# Patient Record
Sex: FEMALE | Race: WHITE | NOT HISPANIC OR LATINO | URBAN - METROPOLITAN AREA
[De-identification: names, ages, dates, MRNs, and addresses within clinical notes are randomized per-mention and may not be internally consistent; named-entity substitution may affect disease eponyms.]

---

## 2023-11-14 ENCOUNTER — LAB (OUTPATIENT)
Dept: LAB | Facility: LAB | Age: 73
End: 2023-11-14
Payer: MEDICARE

## 2023-11-14 DIAGNOSIS — E78.00 PURE HYPERCHOLESTEROLEMIA, UNSPECIFIED: Primary | ICD-10-CM

## 2023-11-14 DIAGNOSIS — I10 ESSENTIAL (PRIMARY) HYPERTENSION: ICD-10-CM

## 2023-11-14 LAB
ALBUMIN SERPL-MCNC: 4.3 G/DL (ref 3.5–5)
ALP BLD-CCNC: 81 U/L (ref 35–125)
ALT SERPL-CCNC: 14 U/L (ref 5–40)
ANION GAP SERPL CALC-SCNC: 11 MMOL/L
AST SERPL-CCNC: 16 U/L (ref 5–40)
BASOPHILS # BLD AUTO: 0.04 X10*3/UL (ref 0–0.1)
BASOPHILS NFR BLD AUTO: 0.5 %
BILIRUB SERPL-MCNC: <0.2 MG/DL (ref 0.1–1.2)
BUN SERPL-MCNC: 14 MG/DL (ref 8–25)
CALCIUM SERPL-MCNC: 9.6 MG/DL (ref 8.5–10.4)
CHLORIDE SERPL-SCNC: 103 MMOL/L (ref 97–107)
CHOLEST SERPL-MCNC: 152 MG/DL (ref 133–200)
CHOLEST/HDLC SERPL: 3 {RATIO}
CO2 SERPL-SCNC: 28 MMOL/L (ref 24–31)
CREAT SERPL-MCNC: 0.7 MG/DL (ref 0.4–1.6)
CREAT UR-MCNC: 141 MG/DL
EOSINOPHIL # BLD AUTO: 0.31 X10*3/UL (ref 0–0.4)
EOSINOPHIL NFR BLD AUTO: 3.6 %
ERYTHROCYTE [DISTWIDTH] IN BLOOD BY AUTOMATED COUNT: 16.1 % (ref 11.5–14.5)
ERYTHROCYTE [SEDIMENTATION RATE] IN BLOOD BY WESTERGREN METHOD: 45 MM/H (ref 0–30)
GFR SERPL CREATININE-BSD FRML MDRD: >90 ML/MIN/1.73M*2
GLUCOSE SERPL-MCNC: 101 MG/DL (ref 65–99)
HCT VFR BLD AUTO: 38.9 % (ref 36–46)
HDLC SERPL-MCNC: 50 MG/DL
HGB BLD-MCNC: 12 G/DL (ref 12–16)
IMM GRANULOCYTES # BLD AUTO: 0.02 X10*3/UL (ref 0–0.5)
IMM GRANULOCYTES NFR BLD AUTO: 0.2 % (ref 0–0.9)
LDLC SERPL CALC-MCNC: 90 MG/DL (ref 65–130)
LYMPHOCYTES # BLD AUTO: 1.55 X10*3/UL (ref 0.8–3)
LYMPHOCYTES NFR BLD AUTO: 18.1 %
MCH RBC QN AUTO: 25.8 PG (ref 26–34)
MCHC RBC AUTO-ENTMCNC: 30.8 G/DL (ref 32–36)
MCV RBC AUTO: 84 FL (ref 80–100)
MICROALBUMIN UR-MCNC: <12 MG/L (ref 0–23)
MICROALBUMIN/CREAT UR: NORMAL MG/G{CREAT}
MONOCYTES # BLD AUTO: 0.58 X10*3/UL (ref 0.05–0.8)
MONOCYTES NFR BLD AUTO: 6.8 %
NEUTROPHILS # BLD AUTO: 6.05 X10*3/UL (ref 1.6–5.5)
NEUTROPHILS NFR BLD AUTO: 70.8 %
NRBC BLD-RTO: 0 /100 WBCS (ref 0–0)
PLATELET # BLD AUTO: 406 X10*3/UL (ref 150–450)
POTASSIUM SERPL-SCNC: 4.5 MMOL/L (ref 3.4–5.1)
PROT SERPL-MCNC: 7.3 G/DL (ref 5.9–7.9)
RBC # BLD AUTO: 4.66 X10*6/UL (ref 4–5.2)
SODIUM SERPL-SCNC: 142 MMOL/L (ref 133–145)
TRIGL SERPL-MCNC: 59 MG/DL (ref 40–150)
WBC # BLD AUTO: 8.6 X10*3/UL (ref 4.4–11.3)

## 2023-11-14 PROCEDURE — 82570 ASSAY OF URINE CREATININE: CPT

## 2023-11-14 PROCEDURE — 80053 COMPREHEN METABOLIC PANEL: CPT

## 2023-11-14 PROCEDURE — 85025 COMPLETE CBC W/AUTO DIFF WBC: CPT

## 2023-11-14 PROCEDURE — 82043 UR ALBUMIN QUANTITATIVE: CPT

## 2023-11-14 PROCEDURE — 85652 RBC SED RATE AUTOMATED: CPT

## 2023-11-14 PROCEDURE — 80061 LIPID PANEL: CPT

## 2025-04-16 ENCOUNTER — TELEMEDICINE CLINICAL SUPPORT (OUTPATIENT)
Dept: SURGERY | Facility: CLINIC | Age: 75
End: 2025-04-16
Payer: MEDICARE

## 2025-04-16 VITALS — WEIGHT: 127 LBS | HEIGHT: 61 IN | BODY MASS INDEX: 23.98 KG/M2

## 2025-04-16 DIAGNOSIS — Z12.11 SCREENING FOR COLORECTAL CANCER: ICD-10-CM

## 2025-04-16 DIAGNOSIS — Z12.12 SCREENING FOR COLORECTAL CANCER: ICD-10-CM

## 2025-04-16 RX ORDER — FOLIC ACID 1 MG/1
1 TABLET ORAL
COMMUNITY
Start: 2025-04-04

## 2025-04-16 RX ORDER — ROPINIROLE 0.5 MG/1
0.5 TABLET, FILM COATED ORAL DAILY
COMMUNITY
Start: 2025-04-04

## 2025-04-16 RX ORDER — METOPROLOL SUCCINATE 25 MG/1
25 TABLET, EXTENDED RELEASE ORAL DAILY
COMMUNITY
Start: 2025-04-04

## 2025-04-16 RX ORDER — ATORVASTATIN CALCIUM 10 MG/1
10 TABLET, FILM COATED ORAL NIGHTLY
COMMUNITY
Start: 2025-02-09

## 2025-04-16 ASSESSMENT — PAIN SCALES - GENERAL: PAINLEVEL_OUTOF10: 0-NO PAIN

## 2025-04-16 ASSESSMENT — PATIENT HEALTH QUESTIONNAIRE - PHQ9
2. FEELING DOWN, DEPRESSED OR HOPELESS: NOT AT ALL
SUM OF ALL RESPONSES TO PHQ9 QUESTIONS 1 AND 2: 0
1. LITTLE INTEREST OR PLEASURE IN DOING THINGS: NOT AT ALL

## 2025-04-16 NOTE — PATIENT INSTRUCTIONS
Please read the following immediately.   A Colonoscopy has been recommended to you.   This is an examination of your large intestine with a lighted flexible tube. You will be given sedative medication through an intravenous catheter and then the physician will pass the flexible tube into the rectum and through your entire large intestines. If you have a polyp (abnormal growth of tissue) it will be removed during the procedure. The physician may also take biopsies (small pieces of tissue for microscopic examination). The procedure will take about 45-60 minutes to complete and then you will rest in recovery for an additional 30-60 minutes while the sedation wears off.     Your colonoscopy will take place at: [address] [facility] [address] on Tuesday 6/3/25.  PURCHASE the following:        · Miralax (Glycolax) 8.3 oz (238 gm bottle) ?   ? Bisacodyl (Dulcolax laxative) 5mg   4 tablets - NOT suppositories   ? 64 oz of Gatorade or any non-carbonated clear liquid (Iced Tea, Crystal Light) Select green, yellow, or clear flavors (NO PURPLE OR RED)     If you take medication to thin your blood, such as Coumadin (Warfarin), Plavix (Clopidogrel), Xarelto (rivaroxaban) or Pradaxa (Dabigatran), Eliquis (Apizaban), Aggrenox (Aspirin/Dipyridamole) etc., ask the doctor that prescribed it for instructions prior to stopping. A baby Aspirin (81mg) may be continued.   STOP all fiber supplements or medications containing Iron 7 days prior to procedure.   Confirm that you have a  for the day of the procedure. You are not allowed to drive for 24 hours after your procedure.              ONE DAY BEFORE PROCEDURE    NO SOLID FOODS / NO ALCOHOL     Please call [phone] the day before your procedure after 2pm to confirm your arrival time.     Clear liquids only ALL DAY     ? AVOID anything red or purple   ? NO milk products or non-dairy creamer.   ? SEE LIST OF CLEAR LIQUIDS SUGGESTIONS   ? Speak with your primary care  physician about your diabetic medications     STARTING PREP: DAY BEFORE COLONOSCOPY     ? 5:00 PM: Mix the 8.3 oz bottle of Miralax in 64 oz of Gatorade or chosen clear liquid.   ? Drink 32 oz (1/2 of the 64 oz bottle) solution at a rate of 8 ounces every 15 min. and take 2 Dulcolax tablets with this. Keep the remaining 32 ounces.   ? Continue with clear liquids until bedtime.     MORNING OF PROCEDURE     5 HOURS PRIOR TO ARRIVAL TIME:     DRINK THE REMAINING 32 OUNCES and 2 Dulcolax tablets.     You may continue to drink clear liquids up to 4 hours prior to the procedure.     Drinking liquids after this will cause us to cancel or postpone your procedure     ** Also no gum, hard candy, mints and tobacco products in these 4 hours.     Not stopping your blood thinner in time as directed by your physician will cause us to cancel and reschedule your procedure.      DIABETICS: DO NOT take oral medication   DIABETA, GLUCOPHAGE   METFORMIN or JANUVIA     - If you are Insulin dependent, do not take your morning dose of insulin.   All patients may take morning medications with sips of water.          SUGGESTED CLEAR LIQUID GUIDE INFORMATION   ? Gatorade or Powerade   ? Clear broth or bouillon - chicken or beef   ? Coffee or Tea (no milk or no-dairy creamer)   ? Carbonated and Non-Carbonated Soft Drinks   ? Jonas-Aid or Crystal Light   ? Strained Fruit Juices (no pulp)   ? Jell-O, Popsicles, or Italian Ice     PRODUCTS TO AVOID   ? Cream or non-dairy creamer   ? Orange, Grapefruit or Tomato Juice   ? Creamed Soups or any soup other than broth   ? Oatmeal   ? Cream of Wheat   ? Milk or milkshakes     THINGS TO BRING WITH YOU!   ? A RESPONSIBLE    ? INSURANCE CARDS   ? A PHOTO ID   ? MEDICATION LIST   ? DURABLE POWER OF  AND LIVING WILL

## 2025-04-16 NOTE — LETTER
April 16, 2025    Raine Rueda  [address] [facility] [address]      Dear Ms. Rueda:        Please read the following immediately.   A Colonoscopy has been recommended to you.   This is an examination of your large intestine with a lighted flexible tube. You will be given sedative medication through an intravenous catheter and then the physician will pass the flexible tube into the rectum and through your entire large intestines. If you have a polyp (abnormal growth of tissue) it will be removed during the procedure. The physician may also take biopsies (small pieces of tissue for microscopic examination). The procedure will take about 45-60 minutes to complete and then you will rest in recovery for an additional 30-60 minutes while the sedation wears off.     Your colonoscopy will take place at: [address] [facility] [address]  PURCHASE the following: on 6/3/25.       · Miralax (Glycolax) 8.3 oz (238 gm bottle) ?   ? Bisacodyl (Dulcolax laxative) 5mg   4 tablets - NOT suppositories   ? 64 oz of Gatorade or any non-carbonated clear liquid (Iced Tea, Crystal Light) Select green, yellow, or clear flavors (NO PURPLE OR RED)     If you take medication to thin your blood, such as Coumadin (Warfarin), Plavix (Clopidogrel), Xarelto (rivaroxaban) or Pradaxa (Dabigatran), Eliquis (Apizaban), Aggrenox (Aspirin/Dipyridamole) etc., ask the doctor that prescribed it for instructions prior to stopping. A baby Aspirin (81mg) may be continued.   STOP all fiber supplements or medications containing Iron 7 days prior to procedure.   Confirm that you have a  for the day of the procedure. You are not allowed to drive for 24 hours after your procedure.              ONE DAY BEFORE PROCEDURE    NO SOLID FOODS / NO ALCOHOL     Please call [phone] the day before your procedure after 2pm to confirm your arrival time.     Clear liquids only ALL DAY     ? AVOID anything red or purple   ? NO milk products or  non-dairy creamer.   ? SEE LIST OF CLEAR LIQUIDS SUGGESTIONS   ? Speak with your primary care physician about your diabetic medications     STARTING PREP: DAY BEFORE COLONOSCOPY     ? 5:00 PM: Mix the 8.3 oz bottle of Miralax in 64 oz of Gatorade or chosen clear liquid.   ? Drink 32 oz (1/2 of the 64 oz bottle) solution at a rate of 8 ounces every 15 min. and take 2 Dulcolax tablets with this. Keep the remaining 32 ounces.   ? Continue with clear liquids until bedtime.     MORNING OF PROCEDURE     5 HOURS PRIOR TO ARRIVAL TIME:     DRINK THE REMAINING 32 OUNCES and 2 Dulcolax tablets.     You may continue to drink clear liquids up to 4 hours prior to the procedure.     Drinking liquids after this will cause us to cancel or postpone your procedure     ** Also no gum, hard candy, mints and tobacco products in these 4 hours.     Not stopping your blood thinner in time as directed by your physician will cause us to cancel and reschedule your procedure.      DIABETICS: DO NOT take oral medication   DIABETA, GLUCOPHAGE   METFORMIN or JANUVIA     - If you are Insulin dependent, do not take your morning dose of insulin.   All patients may take morning medications with sips of water.          SUGGESTED CLEAR LIQUID GUIDE INFORMATION   ? Gatorade or Powerade   ? Clear broth or bouillon - chicken or beef   ? Coffee or Tea (no milk or no-dairy creamer)   ? Carbonated and Non-Carbonated Soft Drinks   ? Jonas-Aid or Crystal Light   ? Strained Fruit Juices (no pulp)   ? Jell-O, Popsicles, or Italian Ice     PRODUCTS TO AVOID   ? Cream or non-dairy creamer   ? Orange, Grapefruit or Tomato Juice   ? Creamed Soups or any soup other than broth   ? Oatmeal   ? Cream of Wheat   ? Milk or milkshakes     THINGS TO BRING WITH YOU!   ? A RESPONSIBLE    ? INSURANCE CARDS   ? A PHOTO ID   ? MEDICATION LIST   ? DURABLE POWER OF  AND LIVING WILL

## 2025-04-16 NOTE — Clinical Note
Pls schedule pt for screening Colonoscopy with Dr. Belcher on 6/3/25 at [facility]. Surgery itinerary/bowel prep instructions reviewed with pt and mailed to her home address.

## 2025-04-16 NOTE — PROGRESS NOTES
This is a 75  year old  female who presents for telemedicine visit via telephone to discuss screening colonoscopy referred by Dr. Arenas  for Dr. Belcher .  Patient states that she had a colonoscopy over 10 years ago at [facility]. Patient states that she believes that she had one colon polyp removed.    Patient denies change in bowel habits, diarrhea, constipation, change in caliber of the stool, blood or mucous in stool, rectal pain, fevers, unintentional weight loss. Patient also denies issues with abdominal pain, nausea, vomiting, acid reflux, indigestion.    Patient denies a family history of colorectal cancer, colon polyps, IBD, other gastro intestinal issues. Patient denies use of blood thinners, NSAIDs. Patient denies use of tobacco, other drug use. Social alcohol use. All pertinent medical history, surgical history, social and family history were reviewed and updated with the patient.    Patient denies taking Glucagon-like peptide-1 (GLP-1) Ozempic, Saxenda, Mounjaro, Tazeum, Trulicity, Lyxumia, Byetta, Byoureon. Patient advised that these medications are used for Type 2 diabetes but more so recently for weight loss management. One side effect of this medication is that it inhibits gastric emptying therefor causing much higher risk of aspirating and desaturating.    Patient is unable to be physically examined due to telephone visit but denies chest pain, shortness of breath, abdominal pain or tenderness, skin abnormalities. Denies knowledge of bright red blood per rectum or hemorrhoids.    Risks, benefits, and alternatives to colonoscopy were discussed. Alternatives including colo guard and FIT testing were discussed as well as their inability to remove polyps that were detected. Patient understands that a bowel prep must be completed for adequate evaluation of the colon, and inadequate prep may allow missed lesions. Patient understands a risk of perforation is less than 1 in 5000 colonoscopy, risk of  serious bleeding or abdominal pain is less than 1%. Patient agrees to proceed with colonoscopy with possible biopsy.    Bowel prep instructions were discussed in detail with patient and reviewed thoroughly. Bowel prep instructions will be mailed to patient's home address on file or sent to the patient's MyChart.  Colonoscopy procedure, risks, and benefits were explained and reviewed with patient. All questions and concerns were addressed this visit.  Patient to be scheduled for screening colonoscopy under MAC with Dr. Belcher at [facility]. Over 30 minutes was spent on this patient counter including televisit, patient counseling and education, assessment and plan, reviewing necessary labs and diagnostics, and discussing pertinent education.      Counseling:  Medication education:  Education:  Current Medication list reviewed and discussed, Understanding:  Patient expressed understanding, Adherence:  No barriers to adherence identified, Education:  Current Medication list reviewed and discussed, Understanding:  Patient expressed understanding, Adherence:  No barriers to adherence identified.     Order placed in Epic for Procedure: Yes    Itinerary and Prep instructions sent: Mailed to home address  If self-pay, Estimate letter sent:N/A      Last EGD:Never per pt  Last COLON:Over 10 yrs ago per pt    Do you have a Family history of colon cancer &/or colon polyps?No  Do you have any GI symptoms you are concerned about?No      Decision Tree Answer From: REQUIRED to complete    What is the patient’ sedation requirement?MAC    Patient location for procedure:[facility]    What is your height? 5'1    What is your weight?127lbs    What is your BMI? 24         Do you have breathing problems?No    Do you have Sleep Apnea?No    Home oxygen use? No         Are you non-ambulatory? No    Do you have severe anxiety or take pain medication on a regular basis? No    Recreational Drug Use? No         Are you an insulin dependent  diabetic? No    Diabetic medications adjusted: No         Are you currently taking any blood thinners? No    Do you have a History of Heart failure or mechanical valves? No  Have you had a stroke (CVA or TIA) in the last 3 months? No    Do you have a Defibrillator (AICD) or Pacemaker & what is the date of last interrogation? No    Are you currently on dialysis? No

## 2025-05-27 ENCOUNTER — PRE-ADMISSION TESTING (OUTPATIENT)
Dept: PREADMISSION TESTING | Facility: HOSPITAL | Age: 75
End: 2025-05-27
Payer: MEDICARE

## 2025-05-27 VITALS
HEIGHT: 60 IN | TEMPERATURE: 98.6 F | OXYGEN SATURATION: 99 % | BODY MASS INDEX: 25.74 KG/M2 | HEART RATE: 79 BPM | WEIGHT: 131.1 LBS | DIASTOLIC BLOOD PRESSURE: 78 MMHG | SYSTOLIC BLOOD PRESSURE: 142 MMHG

## 2025-05-27 DIAGNOSIS — Z01.818 PRE-OP EXAMINATION: Primary | ICD-10-CM

## 2025-05-27 LAB
ANION GAP SERPL CALCULATED.3IONS-SCNC: 10 MMOL/L (ref 10–20)
BASOPHILS # BLD AUTO: 0.07 X10*3/UL (ref 0–0.1)
BASOPHILS NFR BLD AUTO: 0.6 %
BUN SERPL-MCNC: 18 MG/DL (ref 6–23)
CALCIUM SERPL-MCNC: 9.7 MG/DL (ref 8.6–10.3)
CHLORIDE SERPL-SCNC: 105 MMOL/L (ref 98–107)
CO2 SERPL-SCNC: 29 MMOL/L (ref 21–32)
CREAT SERPL-MCNC: 0.97 MG/DL (ref 0.5–1.05)
EGFRCR SERPLBLD CKD-EPI 2021: 61 ML/MIN/1.73M*2
EOSINOPHIL # BLD AUTO: 0.33 X10*3/UL (ref 0–0.4)
EOSINOPHIL NFR BLD AUTO: 2.8 %
ERYTHROCYTE [DISTWIDTH] IN BLOOD BY AUTOMATED COUNT: 24.4 % (ref 11.5–14.5)
GLUCOSE SERPL-MCNC: 89 MG/DL (ref 74–99)
HCT VFR BLD AUTO: 36.8 % (ref 36–46)
HGB BLD-MCNC: 11.4 G/DL (ref 12–16)
IMM GRANULOCYTES # BLD AUTO: 0.03 X10*3/UL (ref 0–0.5)
IMM GRANULOCYTES NFR BLD AUTO: 0.3 % (ref 0–0.9)
LYMPHOCYTES # BLD AUTO: 2.03 X10*3/UL (ref 0.8–3)
LYMPHOCYTES NFR BLD AUTO: 17 %
MCH RBC QN AUTO: 24.7 PG (ref 26–34)
MCHC RBC AUTO-ENTMCNC: 31 G/DL (ref 32–36)
MCV RBC AUTO: 80 FL (ref 80–100)
MONOCYTES # BLD AUTO: 1.13 X10*3/UL (ref 0.05–0.8)
MONOCYTES NFR BLD AUTO: 9.5 %
NEUTROPHILS # BLD AUTO: 8.32 X10*3/UL (ref 1.6–5.5)
NEUTROPHILS NFR BLD AUTO: 69.8 %
NRBC BLD-RTO: 0 /100 WBCS (ref 0–0)
OVALOCYTES BLD QL SMEAR: NORMAL
PLATELET # BLD AUTO: 321 X10*3/UL (ref 150–450)
POTASSIUM SERPL-SCNC: 4.8 MMOL/L (ref 3.5–5.3)
RBC # BLD AUTO: 4.61 X10*6/UL (ref 4–5.2)
RBC MORPH BLD: NORMAL
SCHISTOCYTES BLD QL SMEAR: NORMAL
SODIUM SERPL-SCNC: 139 MMOL/L (ref 136–145)
SPHEROCYTES BLD QL SMEAR: NORMAL
WBC # BLD AUTO: 11.9 X10*3/UL (ref 4.4–11.3)

## 2025-05-27 PROCEDURE — 99204 OFFICE O/P NEW MOD 45 MIN: CPT

## 2025-05-27 PROCEDURE — 85025 COMPLETE CBC W/AUTO DIFF WBC: CPT

## 2025-05-27 PROCEDURE — 80048 BASIC METABOLIC PNL TOTAL CA: CPT

## 2025-05-27 ASSESSMENT — ENCOUNTER SYMPTOMS
ALLERGIC/IMMUNOLOGIC NEGATIVE: 1
NEUROLOGICAL NEGATIVE: 1
GASTROINTESTINAL NEGATIVE: 1
CONSTITUTIONAL NEGATIVE: 1
RESPIRATORY NEGATIVE: 1
HEMATOLOGIC/LYMPHATIC NEGATIVE: 1
MUSCULOSKELETAL NEGATIVE: 1
ENDOCRINE NEGATIVE: 1
PSYCHIATRIC NEGATIVE: 1
EYES NEGATIVE: 1
CARDIOVASCULAR NEGATIVE: 1

## 2025-05-27 ASSESSMENT — DUKE ACTIVITY SCORE INDEX (DASI)
CAN YOU RUN A SHORT DISTANCE: NO
CAN YOU PARTICIPATE IN STRENOUS SPORTS LIKE SWIMMING, SINGLES TENNIS, FOOTBALL, BASKETBALL, OR SKIING: NO
DASI METS SCORE: 8
CAN YOU DO HEAVY WORK AROUND THE HOUSE LIKE SCRUBBING FLOORS OR LIFTING AND MOVING HEAVY FURNITURE: YES
CAN YOU WALK A BLOCK OR TWO ON LEVEL GROUND: YES
CAN YOU DO MODERATE WORK AROUND THE HOUSE LIKE VACUUMING, SWEEPING FLOORS OR CARRYING GROCERIES: YES
CAN YOU PARTICIPATE IN MODERATE RECREATIONAL ACTIVITIES LIKE GOLF, BOWLING, DANCING, DOUBLES TENNIS OR THROWING A BASEBALL OR FOOTBALL: YES
CAN YOU TAKE CARE OF YOURSELF (EAT, DRESS, BATHE, OR USE TOILET): YES
CAN YOU HAVE SEXUAL RELATIONS: YES
CAN YOU WALK INDOORS, SUCH AS AROUND YOUR HOUSE: YES
TOTAL_SCORE: 42.7
CAN YOU DO YARD WORK LIKE RAKING LEAVES, WEEDING OR PUSHING A MOWER: YES
CAN YOU CLIMB A FLIGHT OF STAIRS OR WALK UP A HILL: YES
CAN YOU DO LIGHT WORK AROUND THE HOUSE LIKE DUSTING OR WASHING DISHES: YES

## 2025-05-27 ASSESSMENT — PAIN SCALES - GENERAL: PAINLEVEL_OUTOF10: 0 - NO PAIN

## 2025-05-27 ASSESSMENT — PAIN - FUNCTIONAL ASSESSMENT: PAIN_FUNCTIONAL_ASSESSMENT: 0-10

## 2025-05-27 NOTE — H&P (VIEW-ONLY)
CPM/PAT Evaluation       Name: Raine Rueda (Raine Rueda)  /Age: 1950/75 y.o.     In-Person       Chief Complaint: Colonoscopy    HPI: Raine Rueda is a 75 year old female scheduled for a colonoscopy. She states her last colonoscopy was 10 years ago and was normal.  She denies blood in the stool, nausea, vomiting, constipation, diarrhea, unintentional weight loss, and family history of colon cancer.     Medical History[1]    Surgical History[2]    Social History     Tobacco Use    Smoking status: Never    Smokeless tobacco: Never   Substance Use Topics    Alcohol use: Never     Social History     Substance and Sexual Activity   Drug Use Never         Family History[3]    Allergies[4]  Current Outpatient Medications   Medication Sig Dispense Refill    atorvastatin (Lipitor) 10 mg tablet Take 1 tablet (10 mg) by mouth once daily at bedtime.      folic acid (Folvite) 1 mg tablet Take 1 tablet (1 mg) by mouth early in the morning..      metoprolol succinate XL (Toprol-XL) 25 mg 24 hr tablet Take 1 tablet (25 mg) by mouth once daily. as directed      rOPINIRole (Requip) 0.5 mg tablet Take 1 tablet (0.5 mg) by mouth once daily.       No current facility-administered medications for this visit.          Review of Systems   Constitutional: Negative.    HENT: Negative.     Eyes: Negative.    Respiratory: Negative.     Cardiovascular: Negative.    Gastrointestinal: Negative.    Endocrine: Negative.    Genitourinary: Negative.    Musculoskeletal: Negative.    Skin: Negative.    Allergic/Immunologic: Negative.    Neurological: Negative.    Hematological: Negative.    Psychiatric/Behavioral: Negative.             Physical Exam  Vitals reviewed.   Constitutional:       Appearance: Normal appearance.   HENT:      Head: Normocephalic and atraumatic.      Nose: Nose normal.      Mouth/Throat:      Mouth: Mucous membranes are moist.      Pharynx: Oropharynx is clear.   Eyes:      Extraocular Movements:  Extraocular movements intact.      Conjunctiva/sclera: Conjunctivae normal.   Cardiovascular:      Rate and Rhythm: Normal rate and regular rhythm.      Pulses: Normal pulses.      Heart sounds: Normal heart sounds.   Pulmonary:      Effort: Pulmonary effort is normal.      Breath sounds: Normal breath sounds.   Abdominal:      General: Bowel sounds are normal.      Palpations: Abdomen is soft.   Genitourinary:     Comments: Assessment deferred to physician  Musculoskeletal:         General: Normal range of motion.      Cervical back: Normal range of motion and neck supple.   Skin:     General: Skin is warm and dry.   Neurological:      General: No focal deficit present.      Mental Status: She is alert and oriented to person, place, and time.   Psychiatric:         Mood and Affect: Mood normal.         Behavior: Behavior normal.         Thought Content: Thought content normal.         Judgment: Judgment normal.          PAT AIRWAY:   Airway:     Mallampati::  III    TM distance::  >3 FB    Neck ROM::  Full  normal            Visit Vitals  /78   Pulse 79   Temp 37 °C (98.6 °F) (Temporal)   Ht 1.524 m (5')   Wt 59.5 kg (131 lb 1.6 oz)   SpO2 99%   BMI 25.60 kg/m²   OB Status Postmenopausal   Smoking Status Never   BSA 1.59 m²     ASA: II  CHADS: 2.8%  RCRI: 0.4%  DASI: 42.7  METS: 8  STOP BAN           Assessment and Plan:     Colonoscopy  Hypertension: Metoprolol  Hyperlipidemia: Atorvastatin  Restless leg: Ropinirole      LABS: CBC, BMP ordered in [facility]  [facility]of[facility]ANT[facility]               [1]   Past Medical History:  Diagnosis Date    Hyperlipidemia     Hypertension     Other specified health status     No known health problems    Restless leg syndrome    [2]   Past Surgical History:  Procedure Laterality Date    COLONOSCOPY W/ POLYPECTOMY      over 10 yrs ago, done at [facility] with 1 colon polyp removed    OTHER SURGICAL HISTORY  2021    Shoulder surgery    OTHER SURGICAL HISTORY  2021     Gallbladder surgery   [3]   Family History  Problem Relation Name Age of Onset    Pancreatic cancer Sister  56        passed awayin 5/2024   [4] No Known Allergies

## 2025-05-27 NOTE — PREPROCEDURE INSTRUCTIONS
Medication List            Accurate as of May 27, 2025  2:01 PM. Always use your most recent med list.                atorvastatin 10 mg tablet  Commonly known as: Lipitor  Medication Adjustments for Surgery: Take/Use as prescribed     folic acid 1 mg tablet  Commonly known as: Folvite  Additional Medication Adjustments for Surgery: Take last dose 7 days before surgery     metoprolol succinate XL 25 mg 24 hr tablet  Commonly known as: Toprol-XL  Medication Adjustments for Surgery: Take on the morning of surgery     rOPINIRole 0.5 mg tablet  Commonly known as: Requip  Medication Adjustments for Surgery: Take/Use as prescribed                    Preoperative Fasting Guidelines    Why must I stop eating and drinking near surgery time?  With sedation, food or liquid in your stomach can enter your lungs causing serious complications  Increases nausea and vomiting    When do I need to stop eating and drinking before my surgery?  Do not eat any food after midnight the night before your surgery/procedure.  You may have up to 13.5 ounces of clear liquid until TWO hours before your instructed arrival time to the hospital.  This includes water, black tea/coffee, (no milk or cream) apple juice, and electrolyte drinks (Gatorade)  You may chew gum until TWO hours before your surgery/procedure  FOLLOW BOWEL PREP INSTRUCTIONS RECEIVED FROM DR. ARMSTRONG'S OFFICE    PAT DISCHARGE INSTRUCTIONS    Please call the Same Day Surgery (SDS) Department of the hospital where your procedure will be performed after 2:00 PM the day before your surgery. If you are scheduled on a Monday, or a Tuesday following a Monday holiday, you will need to call on the last business day prior to your surgery.    [facility]  [address] [place] [address] [phone]  [address], [address] [phone]  [facility]  [facility]  [address] [place] [address]  [phone]    Please let your surgeon know if:      You develop any open sores, shingles, burning or painful urination as these may increase your risk of an infection.   You no longer wish to have the surgery.   Any other personal circumstances change that may lead to the need to cancel or defer this surgery-such as being sick or getting admitted to any hospital within one week of your planned procedure.    Your contact details change, such as a change of address or phone number.    Starting now:     Please DO NOT drink alcohol or smoke for 24 hours before surgery. It is well known that quitting smoking can make a huge difference to your health and recovery from surgery. The longer you abstain from smoking, the better your chances of a healthy recovery. If you need help with quitting, call 5-800QUIT-NOW to be connected to a trained counselor who will discuss the best methods to help you quit.     Before your surgery:    Please stop all supplements 7 days prior to surgery. Or as directed by your surgeon.   Please stop taking NSAID pain medicine such as Advil and Motrin 7 days before surgery.    If you develop any fever, cough, cold, rashes, cuts, scratches, scrapes, urinary symptoms or infection anywhere on your body (including teeth and gums) prior to surgery, please call your surgeon’s office as soon as possible. This may require treatment to reduce the chance of cancellation on the day of surgery.    The day before your surgery:   DIET- Please follow the diet instructions at the top of your packet.   Get a good night’s rest.  Use the special soap for bathing if you have been instructed to use one.    Scheduled surgery times may change and you will be notified if this occurs - please check your personal voicemail for any updates.     On the morning of surgery:   Wear comfortable, loose fitting clothes which open in the front. Please do  not wear moisturizers, creams, lotions, makeup or perfume.    Please bring with you to surgery:   Photo ID and insurance card   Current list of medicines and allergies   Pacemaker/ Defibrillator/Heart stent cards   CPAP machine and mask    Slings/ splints/ crutches   A copy of your complete advanced directive/DHPOA.    Please do NOT bring with you to surgery:   All jewelry and valuables should be left at home.   Prosthetic devices such as contact lenses, hearing aids, dentures, eyelash extensions, hairpins and body piercings must be removed prior to going in to the surgical suite.    After outpatient surgery:   A responsible adult MUST accompany you at the time of discharge and stay with you for 24 hours after your surgery. You may NOT drive yourself home after surgery.    Do not drive, operate machinery, make critical decisions or do activities that require co-ordination or balance until after a night’s sleep.   Do not drink alcoholic beverages for 24 hours.   Instructions for resuming your medications will be provided by your surgeon.    CALL YOUR DOCTOR AFTER SURGERY IF YOU HAVE:     Chills and/or a fever of 101° F or higher.    Redness, swelling, pus or drainage from your surgical wound or a bad smell from the wound.    Lightheadedness, fainting or confusion.    Persistent vomiting (throwing up) and are not able to eat or drink for 12 hours.    Three or more loose, watery bowel movements in 24 hours (diarrhea).   Difficulty or pain while urinating( after non-urological surgery)    Pain and swelling in your legs, especially if it is only on one side.    Difficulty breathing or are breathing faster than normal.    Any new concerning symptoms.

## 2025-05-27 NOTE — CPM/PAT H&P
CPM/PAT Evaluation       Name: Raine Rueda (Raine Rueda)  /Age: 1950/75 y.o.     In-Person       Chief Complaint: Colonoscopy    HPI: Raine Rueda is a 75 year old female scheduled for a colonoscopy. She states her last colonoscopy was 10 years ago and was normal.  She denies blood in the stool, nausea, vomiting, constipation, diarrhea, unintentional weight loss, and family history of colon cancer.     Medical History[1]    Surgical History[2]    Social History     Tobacco Use    Smoking status: Never    Smokeless tobacco: Never   Substance Use Topics    Alcohol use: Never     Social History     Substance and Sexual Activity   Drug Use Never         Family History[3]    Allergies[4]  Current Outpatient Medications   Medication Sig Dispense Refill    atorvastatin (Lipitor) 10 mg tablet Take 1 tablet (10 mg) by mouth once daily at bedtime.      folic acid (Folvite) 1 mg tablet Take 1 tablet (1 mg) by mouth early in the morning..      metoprolol succinate XL (Toprol-XL) 25 mg 24 hr tablet Take 1 tablet (25 mg) by mouth once daily. as directed      rOPINIRole (Requip) 0.5 mg tablet Take 1 tablet (0.5 mg) by mouth once daily.       No current facility-administered medications for this visit.          Review of Systems   Constitutional: Negative.    HENT: Negative.     Eyes: Negative.    Respiratory: Negative.     Cardiovascular: Negative.    Gastrointestinal: Negative.    Endocrine: Negative.    Genitourinary: Negative.    Musculoskeletal: Negative.    Skin: Negative.    Allergic/Immunologic: Negative.    Neurological: Negative.    Hematological: Negative.    Psychiatric/Behavioral: Negative.             Physical Exam  Vitals reviewed.   Constitutional:       Appearance: Normal appearance.   HENT:      Head: Normocephalic and atraumatic.      Nose: Nose normal.      Mouth/Throat:      Mouth: Mucous membranes are moist.      Pharynx: Oropharynx is clear.   Eyes:      Extraocular Movements:  Extraocular movements intact.      Conjunctiva/sclera: Conjunctivae normal.   Cardiovascular:      Rate and Rhythm: Normal rate and regular rhythm.      Pulses: Normal pulses.      Heart sounds: Normal heart sounds.   Pulmonary:      Effort: Pulmonary effort is normal.      Breath sounds: Normal breath sounds.   Abdominal:      General: Bowel sounds are normal.      Palpations: Abdomen is soft.   Genitourinary:     Comments: Assessment deferred to physician  Musculoskeletal:         General: Normal range of motion.      Cervical back: Normal range of motion and neck supple.   Skin:     General: Skin is warm and dry.   Neurological:      General: No focal deficit present.      Mental Status: She is alert and oriented to person, place, and time.   Psychiatric:         Mood and Affect: Mood normal.         Behavior: Behavior normal.         Thought Content: Thought content normal.         Judgment: Judgment normal.          PAT AIRWAY:   Airway:     Mallampati::  III    TM distance::  >3 FB    Neck ROM::  Full  normal            Visit Vitals  /78   Pulse 79   Temp 37 °C (98.6 °F) (Temporal)   Ht 1.524 m (5')   Wt 59.5 kg (131 lb 1.6 oz)   SpO2 99%   BMI 25.60 kg/m²   OB Status Postmenopausal   Smoking Status Never   BSA 1.59 m²     ASA: II  CHADS: 2.8%  RCRI: 0.4%  DASI: 42.7  METS: 8  STOP BAN           Assessment and Plan:     Colonoscopy  Hypertension: Metoprolol  Hyperlipidemia: Atorvastatin  Restless leg: Ropinirole      LABS: CBC, BMP [facility] in [facility]  [facility]               [1]   Past Medical History:  Diagnosis Date    Hyperlipidemia     Hypertension     Other specified health status     No known health problems    Restless leg syndrome    [2]   Past Surgical History:  Procedure Laterality Date    COLONOSCOPY W/ POLYPECTOMY      over 10 yrs ago, done at [facility] with 1 colon polyp removed    OTHER SURGICAL HISTORY  2021    Shoulder surgery    OTHER SURGICAL HISTORY  2021     Gallbladder surgery   [3]   Family History  Problem Relation Name Age of Onset    Pancreatic cancer Sister  56        passed awayin 5/2024   [4] No Known Allergies

## 2025-06-03 ENCOUNTER — HOSPITAL ENCOUNTER (OUTPATIENT)
Dept: GASTROENTEROLOGY | Facility: HOSPITAL | Age: 75
Discharge: HOME | End: 2025-06-03
Payer: MEDICARE

## 2025-06-03 ENCOUNTER — ANESTHESIA (OUTPATIENT)
Dept: GASTROENTEROLOGY | Facility: HOSPITAL | Age: 75
End: 2025-06-03
Payer: MEDICARE

## 2025-06-03 ENCOUNTER — ANESTHESIA EVENT (OUTPATIENT)
Dept: GASTROENTEROLOGY | Facility: HOSPITAL | Age: 75
End: 2025-06-03
Payer: MEDICARE

## 2025-06-03 VITALS
OXYGEN SATURATION: 99 % | DIASTOLIC BLOOD PRESSURE: 95 MMHG | HEART RATE: 91 BPM | SYSTOLIC BLOOD PRESSURE: 126 MMHG | RESPIRATION RATE: 16 BRPM | TEMPERATURE: 97 F

## 2025-06-03 DIAGNOSIS — Z12.11 SCREENING FOR COLORECTAL CANCER: ICD-10-CM

## 2025-06-03 DIAGNOSIS — Z12.12 SCREENING FOR COLORECTAL CANCER: ICD-10-CM

## 2025-06-03 PROCEDURE — G0121 COLON CA SCRN NOT HI RSK IND: HCPCS | Performed by: STUDENT IN AN ORGANIZED HEALTH CARE EDUCATION/TRAINING PROGRAM

## 2025-06-03 PROCEDURE — 7100000009 HC PHASE TWO TIME - INITIAL BASE CHARGE

## 2025-06-03 PROCEDURE — 7100000002 HC RECOVERY ROOM TIME - EACH INCREMENTAL 1 MINUTE

## 2025-06-03 PROCEDURE — 7100000001 HC RECOVERY ROOM TIME - INITIAL BASE CHARGE

## 2025-06-03 PROCEDURE — 7100000010 HC PHASE TWO TIME - EACH INCREMENTAL 1 MINUTE

## 2025-06-03 PROCEDURE — 2500000004 HC RX 250 GENERAL PHARMACY W/ HCPCS (ALT 636 FOR OP/ED): Performed by: NURSE ANESTHETIST, CERTIFIED REGISTERED

## 2025-06-03 PROCEDURE — A45378 PR COLONOSCOPY,DIAGNOSTIC: Performed by: ANESTHESIOLOGY

## 2025-06-03 PROCEDURE — 3700000001 HC GENERAL ANESTHESIA TIME - INITIAL BASE CHARGE

## 2025-06-03 PROCEDURE — 45378 DIAGNOSTIC COLONOSCOPY: CPT | Performed by: STUDENT IN AN ORGANIZED HEALTH CARE EDUCATION/TRAINING PROGRAM

## 2025-06-03 PROCEDURE — 2500000001 HC RX 250 WO HCPCS SELF ADMINISTERED DRUGS (ALT 637 FOR MEDICARE OP)

## 2025-06-03 PROCEDURE — 99100 ANES PT EXTEME AGE<1 YR&>70: CPT | Performed by: ANESTHESIOLOGY

## 2025-06-03 PROCEDURE — 3700000002 HC GENERAL ANESTHESIA TIME - EACH INCREMENTAL 1 MINUTE

## 2025-06-03 PROCEDURE — A45378 PR COLONOSCOPY,DIAGNOSTIC: Performed by: NURSE ANESTHETIST, CERTIFIED REGISTERED

## 2025-06-03 RX ORDER — ALBUTEROL SULFATE 0.83 MG/ML
2.5 SOLUTION RESPIRATORY (INHALATION) ONCE
Status: CANCELLED | OUTPATIENT
Start: 2025-06-03 | End: 2025-06-03

## 2025-06-03 RX ORDER — FAMOTIDINE 20 MG/1
20 TABLET, FILM COATED ORAL ONCE
Status: CANCELLED | OUTPATIENT
Start: 2025-06-03 | End: 2025-06-03

## 2025-06-03 RX ORDER — ROCURONIUM BROMIDE 10 MG/ML
INJECTION, SOLUTION INTRAVENOUS AS NEEDED
Status: DISCONTINUED | OUTPATIENT
Start: 2025-06-03 | End: 2025-06-03

## 2025-06-03 RX ORDER — METOCLOPRAMIDE 10 MG/1
10 TABLET ORAL ONCE
Status: CANCELLED | OUTPATIENT
Start: 2025-06-03 | End: 2025-06-03

## 2025-06-03 RX ORDER — METOPROLOL SUCCINATE 25 MG/1
25 TABLET, EXTENDED RELEASE ORAL ONCE
Status: COMPLETED | OUTPATIENT
Start: 2025-06-03 | End: 2025-06-03

## 2025-06-03 RX ORDER — LABETALOL HYDROCHLORIDE 5 MG/ML
10 INJECTION, SOLUTION INTRAVENOUS ONCE AS NEEDED
Status: CANCELLED | OUTPATIENT
Start: 2025-06-03

## 2025-06-03 RX ORDER — ONDANSETRON HYDROCHLORIDE 2 MG/ML
4 INJECTION, SOLUTION INTRAVENOUS ONCE AS NEEDED
Status: CANCELLED | OUTPATIENT
Start: 2025-06-03

## 2025-06-03 RX ORDER — METOPROLOL SUCCINATE 50 MG/1
TABLET, EXTENDED RELEASE ORAL
Status: COMPLETED
Start: 2025-06-03 | End: 2025-06-03

## 2025-06-03 RX ORDER — ALBUTEROL SULFATE 0.83 MG/ML
2.5 SOLUTION RESPIRATORY (INHALATION) ONCE AS NEEDED
Status: CANCELLED | OUTPATIENT
Start: 2025-06-03

## 2025-06-03 RX ORDER — PROPOFOL 10 MG/ML
INJECTION, EMULSION INTRAVENOUS AS NEEDED
Status: DISCONTINUED | OUTPATIENT
Start: 2025-06-03 | End: 2025-06-03

## 2025-06-03 RX ORDER — HYDRALAZINE HYDROCHLORIDE 20 MG/ML
10 INJECTION INTRAMUSCULAR; INTRAVENOUS EVERY 30 MIN PRN
Status: CANCELLED | OUTPATIENT
Start: 2025-06-03

## 2025-06-03 RX ORDER — DIPHENHYDRAMINE HYDROCHLORIDE 50 MG/ML
12.5 INJECTION, SOLUTION INTRAMUSCULAR; INTRAVENOUS ONCE AS NEEDED
Status: CANCELLED | OUTPATIENT
Start: 2025-06-03

## 2025-06-03 RX ORDER — LIDOCAINE HYDROCHLORIDE 10 MG/ML
0.1 INJECTION, SOLUTION EPIDURAL; INFILTRATION; INTRACAUDAL; PERINEURAL ONCE
Status: CANCELLED | OUTPATIENT
Start: 2025-06-03 | End: 2025-06-03

## 2025-06-03 RX ADMIN — PROPOFOL 200 MG: 10 INJECTION, EMULSION INTRAVENOUS at 13:43

## 2025-06-03 RX ADMIN — PROPOFOL 100 MG: 10 INJECTION, EMULSION INTRAVENOUS at 13:54

## 2025-06-03 RX ADMIN — METOPROLOL SUCCINATE 25 MG: 50 TABLET, EXTENDED RELEASE ORAL at 12:39

## 2025-06-03 RX ADMIN — METOPROLOL SUCCINATE 25 MG: 25 TABLET, EXTENDED RELEASE ORAL at 12:39

## 2025-06-03 RX ADMIN — SODIUM CHLORIDE: 9 INJECTION, SOLUTION INTRAVENOUS at 13:38

## 2025-06-03 SDOH — HEALTH STABILITY: MENTAL HEALTH: CURRENT SMOKER: 0

## 2025-06-03 ASSESSMENT — PAIN - FUNCTIONAL ASSESSMENT
PAIN_FUNCTIONAL_ASSESSMENT: CPOT (CRITICAL CARE PAIN OBSERVATION TOOL)
PAIN_FUNCTIONAL_ASSESSMENT: 0-10
PAIN_FUNCTIONAL_ASSESSMENT: 0-10

## 2025-06-03 ASSESSMENT — PAIN SCALES - GENERAL
PAINLEVEL_OUTOF10: 0 - NO PAIN

## 2025-06-03 NOTE — ANESTHESIA PREPROCEDURE EVALUATION
Patient: Raine Rueda    Procedure Information       Date/Time: 06/03/25 1330    Scheduled providers: Nona Belcher MD; Dustin Poole DO; GABBY Seaman    Procedure: COLONOSCOPY    Location: [facility]            Relevant Problems   No relevant active problems       Clinical information reviewed:   Tobacco  Allergies  Meds   Med Hx  Surg Hx   Fam Hx  Soc Hx        NPO Detail:  NPO/Void Status  Carbohydrate Drink Given Prior to Surgery? : N  Date of Last Liquid: 06/03/25  Time of Last Liquid: 0700  Date of Last Solid: 06/01/25  Time of Last Solid: 1830  Last Intake Type: Clear fluids  Time of Last Void: 1100         Physical Exam    Airway  Mallampati: II  TM distance: >3 FB  Mouth opening: 3 or more finger widths     Cardiovascular   Rhythm: regular  Rate: normal     Dental - normal exam     Pulmonary    Abdominal            Anesthesia Plan    History of general anesthesia?: unknown/emergency  History of complications of general anesthesia?: no    ASA 3     general and other   There is reasoning for not using neuraxial anesthesia or a peripheral nerve block.  (Noted to have GS intubation for merrill, labs acceptabel, no EKG located in the emr)  The patient is not a current smoker.  Education provided regarding risk of obstructive sleep apnea. (in appropriate circumstances)  intravenous induction   Anesthetic plan and risks discussed with patient.    Plan discussed with CRNA, CAA and attending.

## 2025-06-03 NOTE — POST-PROCEDURE NOTE
Pt resting comfortable with no signs of distress. Vital signs are WNL, and pt is tolerating food & water with no issues.

## 2025-06-03 NOTE — DISCHARGE INSTRUCTIONS
Instructions  Patient Instructions after a Colonoscopy, Upper GI, and ERCP      The anesthetics, sedatives or narcotics which were given to you today will be acting in your body for the next 24 hours, so you might feel a little sleepy or groggy.  This feeling should slowly wear off. Carefully read and follow the instructions.      You received sedation today:  - Do not drive or operate any machinery or power tools of any kind.   - No alcoholic beverages today, not even beer or wine.  - Do not make any important decisions or sign any legal documents.  - No over the counter medications that contain alcohol or that may cause drowsiness.  - Do not make any important decisions or sign any legal documents.     While it is common to experience mild to moderate abdominal distention, gas, or belching after your procedure, if any of these symptoms occur following discharge from the GI Lab or within one week of having your procedure, call the [facility] to be advised whether a visit to your nearest Urgent Care or Emergency Department is indicated.  Take this paper with you if you go.      - If you develop an allergic reaction to the medications that were given during your procedure such as difficulty breathing, rash, hives, severe nausea, vomiting or lightheadedness.- If you experience chest pain, shortness of breath, severe abdominal pain, fevers and chills.     -If you develop signs and symptoms of bleeding such as blood in your spit, if your stools turn black, tarry, or bloody     - If you have not urinated within 8 hours following your procedure.- If your IV site becomes painful, red, inflamed, or looks infected.     If you received a biopsy/polypectomy/sphincterotomy the following instructions apply below:     - Do not use Aspirin containing products, non-steroidal medications or anti-coagulants for one week following your procedure. (Examples of these types of medications are: Advil, Arthrotec, Aleve,  Coumadin, Ecotrin, Heparin, Ibuprofen, Indocin, Motrin, Naprosyn, Nuprin, Plavix, Vioxx, and Voltarin, or their generic forms.  This list is not all-inclusive.  Check with your physician or pharmacist before resuming medications.)      - Eat a soft diet today.  Avoid foods that are poorly digested for the next 24 hours.  These foods would include: nuts, beans, lettuce, red meats, and fried foods.       - Start with liquids and advance your diet as tolerated, gradually work up to eating solids.      - Do not have a Barium Study or Enema for one week.     Your physician recommends the additional following instructions:     Colonoscopy: Resume your previous diet, continue your present medications, a repeated colonoscopy will be determined based on pathology result. Return to normal activity tomorrow.

## 2025-06-05 ASSESSMENT — PAIN SCALES - GENERAL: PAIN_LEVEL: 0

## 2025-06-05 NOTE — ANESTHESIA POSTPROCEDURE EVALUATION
Patient: Raine Rueda    Procedure Summary       Date: 06/03/25 Room / Location: [facility]    Anesthesia Start: 1338 Anesthesia Stop: 1419    Procedure: COLONOSCOPY Diagnosis: Screening for colorectal cancer    Scheduled Providers: Nona Belcher MD; Dustin Poole DO; GABBY Seaman Responsible Provider: Dustin Poole DO    Anesthesia Type: general, other ASA Status: 3            Anesthesia Type: general, other    Vitals Value Taken Time   /79 06/03/25 14:51   Temp 35.6 °C (96.1 °F) 06/03/25 14:15   Pulse 72 06/03/25 14:50   Resp 29 06/03/25 14:50   SpO2 99 % 06/03/25 14:50   Vitals shown include unfiled device data.    Anesthesia Post Evaluation    Patient location during evaluation: bedside  Patient participation: complete - patient participated  Level of consciousness: awake  Pain score: 0  Pain management: adequate  Airway patency: patent  Two or more strategies used to mitigate risk of obstructive sleep apnea  Cardiovascular status: acceptable  Respiratory status: acceptable  Hydration status: acceptable  Postoperative Nausea and Vomiting: none  Comments: See intraoperative record for anesthetic actions related to the preoperative anesthesia plan.        No notable events documented.